# Patient Record
Sex: FEMALE | Employment: UNEMPLOYED | URBAN - METROPOLITAN AREA
[De-identification: names, ages, dates, MRNs, and addresses within clinical notes are randomized per-mention and may not be internally consistent; named-entity substitution may affect disease eponyms.]

---

## 2020-07-29 ENCOUNTER — OFFICE VISIT (OUTPATIENT)
Dept: GENETICS | Facility: CLINIC | Age: 40
End: 2020-07-29
Payer: MEDICAID

## 2020-07-29 DIAGNOSIS — M25.50 ARTHRALGIA, UNSPECIFIED JOINT: ICD-10-CM

## 2020-07-29 DIAGNOSIS — M24.80 HYPERMOBILE JOINT SYNDROME OF MULTIPLE SITES: ICD-10-CM

## 2020-07-29 PROCEDURE — 99999 PR PBB SHADOW E&M-NEW PATIENT-LVL I: ICD-10-PCS | Mod: PBBFAC,,, | Performed by: MEDICAL GENETICS

## 2020-07-29 PROCEDURE — 99203 PR OFFICE/OUTPT VISIT, NEW, LEVL III, 30-44 MIN: ICD-10-PCS | Mod: S$PBB,,, | Performed by: MEDICAL GENETICS

## 2020-07-29 PROCEDURE — 99201 *HC E&M-NEW PATIENT-LVL I: CPT | Mod: PBBFAC | Performed by: MEDICAL GENETICS

## 2020-07-29 PROCEDURE — 99203 OFFICE O/P NEW LOW 30 MIN: CPT | Mod: S$PBB,,, | Performed by: MEDICAL GENETICS

## 2020-07-29 PROCEDURE — 99999 PR PBB SHADOW E&M-NEW PATIENT-LVL I: CPT | Mod: PBBFAC,,, | Performed by: MEDICAL GENETICS

## 2020-07-29 RX ORDER — VENLAFAXINE HYDROCHLORIDE 150 MG/1
150 CAPSULE, EXTENDED RELEASE ORAL
COMMUNITY

## 2020-07-29 RX ORDER — PROPRANOLOL HYDROCHLORIDE 20 MG/1
TABLET ORAL
COMMUNITY
Start: 2020-05-06

## 2020-07-29 RX ORDER — KETOROLAC TROMETHAMINE 10 MG/1
TABLET, FILM COATED ORAL
COMMUNITY
Start: 2020-05-19

## 2020-07-29 RX ORDER — CLOBETASOL PROPIONATE 0.5 MG/G
CREAM TOPICAL
COMMUNITY
Start: 2020-05-06

## 2020-07-29 RX ORDER — VENLAFAXINE HYDROCHLORIDE 150 MG/1
CAPSULE, EXTENDED RELEASE ORAL
COMMUNITY
Start: 2020-07-16

## 2020-07-29 NOTE — PROGRESS NOTES
Karina Carrera  DOS: 22  : 84  MRN: [id number]    PRESENT ILLNESS: Ms. Carrera is a 39-year-old female who presents with her son  for an evaluation of a possible connective tissue disorder.      Ms. Carrera stated that shes always been very flexible but no joint dislocations requiring surgery. She also has easy bruising, thin skin, poor wound healing, and joint laxity. She does report arthralgia. Her son  ([id number]) is also here for an evaluation of connective tissue disorder.    PAST MEDICAL HISTORY: depression    MEDICATIONS: Effexor    ALLERGIES: Sulfa    DEVELOPMENTAL HISTORY: normal    FAMILY HISTORY: Her 11-year-old son  ([id number]) is also here for an evaluation of connective tissue disorder. He almost met Beighton scale. Her brother is also flexible. Her MGGM  from cerebral aneurysm at the age of 89.    PHYSICAL EXAMINATION: normocephalic head and no appreciable dysmorphic facial features. There are no dysmorphic features in the hands or feet. The patient had 7 out of 9 points on the Beighton scale of hyperextensibility while more than 5 defines hypermobility. Her son had 5 out of 9 points on the Beighton scale of hyperextensibility. She had normal language and cognition.    IMPRESSION: At this time, the patients physical exam, medical history and family history are suspicious for a connective tissue laxity since she has 7 out of 9 points on the Beighton scale of hyperextensibility while more than 5 defines hypermobility and her son has 5 points.    Tricia-Danlos syndrome hypermobility type (EDSH) is in a differential but its hard to say with certainty since he never had joints dislocations but is likely going to need surgery for his foot deformities. Theres another entity with significant overlap called benign joint hypermobility syndrome (BJHS) and theres a fine line between BJHS and EDSH. EDS has 13 types out of which 12 types have genes associated with them and can  be tested for. Genetic basis for EDSH or BJHS is unknown and no gene can be tested at this time. BJHS may just be a normal variation of connective tissue laxity and can be very difficult to distinguish from EDSH as reported by Sherrill et al (2009). Overall 20% of population is hyperextensible.    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 For all constructive purposes, since theres no clear clinical distinction between them and no genetic testing available.     While she has hyperextensibility she does not have history of tissue fragility, i.e. poor wound healing, thin translucent skin or atrophic scars that are more characteristic of EDS classic type (EDSC). Theres some overlap between EDSC and EDSH, with EDSC involving COL5A1 and COL5A2 gene mutations which has a 90% yield. Differential diagnosis of EDSH includes vascular EDS (EDSV) caused by mutations in the COL3A1 (98% yield).    Genetic testing for EDSC and EDSV is available but typically has a low yield especially in BJHS or EDSH while variants of unknown significance can make diagnosis and treatment even more difficult. The patient decided not to proceed at this time. Celina given her information about hypermobility syndromes. EDSH and BJHS are autosomal dominant with a 50% chance of being passed to the offspring. I did refer her and her son to a physical therapist specializing in connective tissue disorders which would be the most helpful.    Management of any connective tissue disorder includes reduction of joint hyperextension, resistance/isometric exercise, lifting heavy objects and avoidance of high-impact activity such as contact sports. Recreational swimming, jogging, biking and golf are more preferable. If objects need to be lifted from the ground, knees should be bent to grab the object and raise the body with the object, as opposed to lifting  without bending the knees. The back is at a high risk for complications. Physical therapy for assistive devices (braces to improve joint stability) is recommended. PT script was given.    Myofascial release (any physical therapy modality that reduces spasm) provides short-term relief of pain, lasting hours to days. While the duration of benefit is short and it must be repeated frequently, this pain relief may be critical to facilitate participation in toning exercise for stabilization of the joints. Modalities must be tailored to the individual; a partial list includes heat, cold, massage, ultrasound, electrical stimulation, acupuncture, acupressure, biofeedback, and conscious relaxation. Low-resistance muscle toning exercise can improve joint stability and reduce future subluxations, dislocations, and pain. Progress should be made by gradually increasing repetitions, frequency, or duration, not resistance. It often takes months or years for significant progress to be recognized.    Improved joint stability may be achieved by low-resistance exercise to increase muscle tone (subconscious resting muscle contraction), as opposed to muscle strength (voluntary force exerted at will). Emphasis should be placed on both core and extremity muscle tone. Examples include walking, bicycling, low-impact aerobics, swimming or water exercise, and simple range-of-motion exercise without added resistance. Core toning activities, such as balance exercises and repetitive motions focusing on the abdominal, lumbar, and interscapular muscles, are also important. High-impact activity increases the risk for acute subluxation/dislocation, chronic pain, and osteoarthritis. Some sports, such as tackle football, basketball or soccer, are therefore contraindicated. Gymnastics is typically discouraged or pursued with caution. However, most sports and activities are acceptable with appropriate precautions.    Wide- writing utensils can reduce  strain on finger and hand joints. An unconventional grasp of a writing utensil, gently resting the shaft in the web between the thumb and index finger and securing the tip between the distal interphalangeal joints or middle phalanges of the index and third fingers (rather than using the tips of the fingers), results in substantially reduced axial stress to the interphalangeal, metacarpophalangeal, and carpometacarpal joints. These adjustments frequently result in marked reduction of pain in the index finger and at the base of the thumb. Chiropractic adjustment is not strictly contraindicated, but must be performed cautiously to avoid iatrogenic subluxations or dislocations.    Braces are useful to improve joint stability. Orthopedists, rheumatologists, and physical therapists can assist in recommending appropriate devices for commonly problematic joints such as knees and ankles. Shoulders and hips present more of a challenge for external bracing. Occupational therapists may be consulted for ring splints (to stabilize interphalangeal joints) and wrist or wrist/thumb braces in affected individuals with small joint instability. A soft neck collar, if tolerated, may help with neck pain and headaches. A waterbed, adjustable air mattress, or viscoelastic foam mattress (and/or pillow) may provide increased support with improved sleep quality and less pain.    Celina also referred the patient to the Functional Restoration Program (FRP) at [facility] which helped many of my patients with chronic pain and fatigue. [phone] [email or web address].    RECOMMENDATIONS:   1. COL5A1, COL5A2, COL3A1 (declined due to a low yield and unclear variants).  2. Referral to PT familiar with EDS.  3. Information and resources provided.  4. Management as above.  5. Referral to FRP.  6. Follow up in 1-2 years prn.    REFERENCES:  1. Antonio YBARRA. Tricia-Danlos Syndrome, Hypermobility Type. 2004 Oct 22 [Updated 2012 Sep 13]. In: Ross BAEZ,  Ravin MP, Zafar TD, et al., editors. GeneReviews [Internet]. [place] ([place]): [facility], [place]; 4603-8271. Available from: http://www.ncbi.nlm.nih.gov/books/XQV4326.  2. Sherrill et al. Hypermobile Tricia-Danlos syndrome (a.k.a. Tricia-Danlos syndrome Type III and Tricia-Danlos syndrome hypermobility type): Clinical description and natural history. Am J Med Katherine C Semin Med Katherine. 2017 Mar;175(1):48-69.    Time spent: 40 minutes, more than 50% counseling. The note will be faxed to the referring physician and is in epic.    Nico Gautam M.D.   Section Head - Medical Genetics   [facility]